# Patient Record
Sex: MALE | Race: BLACK OR AFRICAN AMERICAN | NOT HISPANIC OR LATINO | URBAN - METROPOLITAN AREA
[De-identification: names, ages, dates, MRNs, and addresses within clinical notes are randomized per-mention and may not be internally consistent; named-entity substitution may affect disease eponyms.]

---

## 2017-09-15 ENCOUNTER — EMERGENCY (EMERGENCY)
Facility: HOSPITAL | Age: 27
LOS: 1 days | Discharge: HOME | End: 2017-09-15
Attending: EMERGENCY MEDICINE | Admitting: EMERGENCY MEDICINE
Payer: COMMERCIAL

## 2017-09-15 VITALS
RESPIRATION RATE: 18 BRPM | WEIGHT: 219.36 LBS | OXYGEN SATURATION: 97 % | TEMPERATURE: 98 F | HEART RATE: 59 BPM | SYSTOLIC BLOOD PRESSURE: 138 MMHG | DIASTOLIC BLOOD PRESSURE: 92 MMHG

## 2017-09-15 VITALS
HEART RATE: 64 BPM | SYSTOLIC BLOOD PRESSURE: 134 MMHG | TEMPERATURE: 99 F | OXYGEN SATURATION: 99 % | DIASTOLIC BLOOD PRESSURE: 87 MMHG | RESPIRATION RATE: 18 BRPM

## 2017-09-15 DIAGNOSIS — Z90.89 ACQUIRED ABSENCE OF OTHER ORGANS: Chronic | ICD-10-CM

## 2017-09-15 DIAGNOSIS — J03.90 ACUTE TONSILLITIS, UNSPECIFIED: Chronic | ICD-10-CM

## 2017-09-15 DIAGNOSIS — R05 COUGH: ICD-10-CM

## 2017-09-15 DIAGNOSIS — J45.909 UNSPECIFIED ASTHMA, UNCOMPLICATED: ICD-10-CM

## 2017-09-15 DIAGNOSIS — J06.9 ACUTE UPPER RESPIRATORY INFECTION, UNSPECIFIED: ICD-10-CM

## 2017-09-15 DIAGNOSIS — Z79.899 OTHER LONG TERM (CURRENT) DRUG THERAPY: ICD-10-CM

## 2017-09-15 PROCEDURE — 99283 EMERGENCY DEPT VISIT LOW MDM: CPT | Mod: 25

## 2017-09-15 PROCEDURE — 99284 EMERGENCY DEPT VISIT MOD MDM: CPT | Mod: 25

## 2017-09-15 PROCEDURE — 71020: CPT | Mod: 26

## 2017-09-15 PROCEDURE — 71046 X-RAY EXAM CHEST 2 VIEWS: CPT

## 2017-09-15 RX ORDER — FLUTICASONE PROPIONATE 50 MCG
1 SPRAY, SUSPENSION NASAL
Qty: 1 | Refills: 0 | OUTPATIENT
Start: 2017-09-15 | End: 2017-10-15

## 2017-09-15 RX ORDER — ALBUTEROL 90 UG/1
2 AEROSOL, METERED ORAL
Qty: 1 | Refills: 0 | OUTPATIENT
Start: 2017-09-15 | End: 2017-10-15

## 2017-09-15 NOTE — ED PROVIDER NOTE - LAB INTERPRETATION
ER Physician: June Ree  CHEST XRAY INTERPRETATION: lungs clear, heart shadow normal, bony structures intact

## 2017-09-15 NOTE — ED PROVIDER NOTE - PHYSICAL EXAMINATION
VITAL SIGNS: I have reviewed nursing notes and confirm.  CONSTITUTIONAL: Well-developed; well-nourished; in no acute distress.   SKIN:  warm and dry, no acute rash.   HEAD:  normocephalic, atraumatic.  EYES: PERRL, EOM intact; conjunctiva and sclera clear.  ENT: No nasal discharge; airway clear. O/P: mild erythema to post pharynx, tonsils non-enlarged, no exudates. Uvula midline.   NECK: Supple; non tender.  CARD: S1, S2 normal; no murmurs, gallops, or rubs. Regular rate and rhythm.   RESP:  Clear to auscultation b/l, no wheezes, rales or rhonchi.  ABD: Normal bowel sounds; soft; non-distended; non-tender; no guarding/ rebound.  NEURO: Alert, oriented, grossly unremarkable

## 2017-09-15 NOTE — ED ADULT NURSE NOTE - OBJECTIVE STATEMENT
pt presents to the ED alert and oriented x3 w/ even and unlabored resp w/ hx of asthma c/o productive cough, congestion, body aches, sore throat for two weeks. does not have alb pump at home. in NAD.

## 2017-09-15 NOTE — ED PROVIDER NOTE - OBJECTIVE STATEMENT
Pt is a 28yo m, h/o asthma, no prior intubations, who p/w cough, nasal congestion, rhinorrhea, sorethroat starting 2 wks ago and not improving. Has been using alb neb at night with no improvement. No cp, sob, fever, chills, abd pain, n/v/d, urinary sx's. No sick contacts/ recent travel.

## 2017-09-15 NOTE — ED PROVIDER NOTE - MEDICAL DECISION MAKING DETAILS
Impression: acute uri, likely viral. Afebrile. VSS. CXR neg for i/e. Pt advised on supportive care. Will dc home w/ rx for tessalon perles, flonase, and albuterol inhaler.

## 2017-09-16 NOTE — ED POST DISCHARGE NOTE - RESULT SUMMARY
information relayed to pt regarding final read- to request pmd to request records here for follow up imaging, discussed with radiology attending, non emergent concern, can follow up as routine outpt.

## 2017-09-25 ENCOUNTER — EMERGENCY (EMERGENCY)
Facility: HOSPITAL | Age: 27
LOS: 1 days | Discharge: HOME | End: 2017-09-25
Attending: EMERGENCY MEDICINE | Admitting: EMERGENCY MEDICINE
Payer: COMMERCIAL

## 2017-09-25 VITALS
TEMPERATURE: 99 F | OXYGEN SATURATION: 100 % | DIASTOLIC BLOOD PRESSURE: 96 MMHG | HEART RATE: 58 BPM | RESPIRATION RATE: 16 BRPM | SYSTOLIC BLOOD PRESSURE: 147 MMHG

## 2017-09-25 VITALS
HEART RATE: 56 BPM | DIASTOLIC BLOOD PRESSURE: 91 MMHG | OXYGEN SATURATION: 100 % | TEMPERATURE: 99 F | SYSTOLIC BLOOD PRESSURE: 142 MMHG | RESPIRATION RATE: 17 BRPM | HEIGHT: 72 IN | WEIGHT: 199.96 LBS

## 2017-09-25 DIAGNOSIS — R05 COUGH: ICD-10-CM

## 2017-09-25 DIAGNOSIS — J40 BRONCHITIS, NOT SPECIFIED AS ACUTE OR CHRONIC: ICD-10-CM

## 2017-09-25 DIAGNOSIS — F17.200 NICOTINE DEPENDENCE, UNSPECIFIED, UNCOMPLICATED: ICD-10-CM

## 2017-09-25 DIAGNOSIS — Z79.2 LONG TERM (CURRENT) USE OF ANTIBIOTICS: ICD-10-CM

## 2017-09-25 DIAGNOSIS — Z79.899 OTHER LONG TERM (CURRENT) DRUG THERAPY: ICD-10-CM

## 2017-09-25 DIAGNOSIS — J03.90 ACUTE TONSILLITIS, UNSPECIFIED: Chronic | ICD-10-CM

## 2017-09-25 DIAGNOSIS — Z90.89 ACQUIRED ABSENCE OF OTHER ORGANS: Chronic | ICD-10-CM

## 2017-09-25 PROCEDURE — 94640 AIRWAY INHALATION TREATMENT: CPT

## 2017-09-25 PROCEDURE — 71020: CPT | Mod: 26

## 2017-09-25 PROCEDURE — 99283 EMERGENCY DEPT VISIT LOW MDM: CPT | Mod: 25

## 2017-09-25 PROCEDURE — 99284 EMERGENCY DEPT VISIT MOD MDM: CPT

## 2017-09-25 PROCEDURE — 71046 X-RAY EXAM CHEST 2 VIEWS: CPT

## 2017-09-25 RX ORDER — FLUTICASONE PROPIONATE 220 MCG
2 AEROSOL WITH ADAPTER (GRAM) INHALATION
Qty: 1 | Refills: 0 | OUTPATIENT
Start: 2017-09-25 | End: 2017-10-25

## 2017-09-25 RX ORDER — AZITHROMYCIN 500 MG/1
1 TABLET, FILM COATED ORAL
Qty: 6 | Refills: 0 | OUTPATIENT
Start: 2017-09-25 | End: 2017-09-30

## 2017-09-25 RX ORDER — IPRATROPIUM/ALBUTEROL SULFATE 18-103MCG
3 AEROSOL WITH ADAPTER (GRAM) INHALATION ONCE
Qty: 0 | Refills: 0 | Status: COMPLETED | OUTPATIENT
Start: 2017-09-25 | End: 2017-09-25

## 2017-09-25 RX ADMIN — Medication 3 MILLILITER(S): at 16:18

## 2017-09-25 NOTE — ED PROVIDER NOTE - OBJECTIVE STATEMENT
28 y/o m with h/o asthma presents to ED c/o persistent dry cough x3 weeks, afebrile. He state he recently had a URI but now resolved. Report to feel sob ,wheezing and has to use his nebulizer at home more frequently. Denies URI symptoms at this time, productive cough, chest pain, n, v, abd pain. + h/o smoker.

## 2017-09-25 NOTE — ED ADULT NURSE NOTE - OBJECTIVE STATEMENT
states he was here a few weeks ago, had CXR and dc on tessalon pearls.  states he got a call stating he should come back for another chest xray.  staets cough persists with +runny nose and sore throat. denies fever/chills.  no n/v/d. lungs CTA

## 2017-09-25 NOTE — ED PROVIDER NOTE - ATTENDING CONTRIBUTION TO CARE
patient seen for URI sx and wheezing.  given bronchodilators with resolution of sx, comfortable, lungs clear at time of dc and PF>500.  CXR clear.  will dc.

## 2018-07-30 ENCOUNTER — EMERGENCY (EMERGENCY)
Facility: HOSPITAL | Age: 28
LOS: 1 days | Discharge: HOME | End: 2018-07-30
Admitting: EMERGENCY MEDICINE
Payer: COMMERCIAL

## 2018-07-30 VITALS
HEART RATE: 67 BPM | DIASTOLIC BLOOD PRESSURE: 92 MMHG | RESPIRATION RATE: 17 BRPM | OXYGEN SATURATION: 98 % | SYSTOLIC BLOOD PRESSURE: 143 MMHG

## 2018-07-30 VITALS
TEMPERATURE: 98 F | SYSTOLIC BLOOD PRESSURE: 155 MMHG | HEART RATE: 66 BPM | DIASTOLIC BLOOD PRESSURE: 104 MMHG | RESPIRATION RATE: 18 BRPM | OXYGEN SATURATION: 99 %

## 2018-07-30 DIAGNOSIS — Z79.2 LONG TERM (CURRENT) USE OF ANTIBIOTICS: ICD-10-CM

## 2018-07-30 DIAGNOSIS — M79.671 PAIN IN RIGHT FOOT: ICD-10-CM

## 2018-07-30 DIAGNOSIS — Z90.89 ACQUIRED ABSENCE OF OTHER ORGANS: Chronic | ICD-10-CM

## 2018-07-30 DIAGNOSIS — J03.90 ACUTE TONSILLITIS, UNSPECIFIED: Chronic | ICD-10-CM

## 2018-07-30 DIAGNOSIS — J45.909 UNSPECIFIED ASTHMA, UNCOMPLICATED: ICD-10-CM

## 2018-07-30 DIAGNOSIS — Z79.899 OTHER LONG TERM (CURRENT) DRUG THERAPY: ICD-10-CM

## 2018-07-30 PROCEDURE — 73630 X-RAY EXAM OF FOOT: CPT

## 2018-07-30 PROCEDURE — 73630 X-RAY EXAM OF FOOT: CPT | Mod: 26,RT

## 2018-07-30 PROCEDURE — 99283 EMERGENCY DEPT VISIT LOW MDM: CPT | Mod: 25

## 2018-07-30 PROCEDURE — 99283 EMERGENCY DEPT VISIT LOW MDM: CPT

## 2018-07-30 PROCEDURE — 73630 X-RAY EXAM OF FOOT: CPT | Mod: 26

## 2018-07-30 RX ORDER — IBUPROFEN 200 MG
1 TABLET ORAL
Qty: 20 | Refills: 0 | OUTPATIENT
Start: 2018-07-30 | End: 2018-08-03

## 2018-07-30 RX ORDER — IBUPROFEN 200 MG
800 TABLET ORAL ONCE
Qty: 0 | Refills: 0 | Status: COMPLETED | OUTPATIENT
Start: 2018-07-30 | End: 2018-07-30

## 2018-07-30 RX ADMIN — Medication 800 MILLIGRAM(S): at 07:26

## 2018-07-30 NOTE — ED ADULT TRIAGE NOTE - CHIEF COMPLAINT QUOTE
I was playing basketball yesterday and I think I did something to my foot.  It isn't the ankle, its on the right heel.
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
## 2018-07-30 NOTE — ED PROVIDER NOTE - MEDICAL DECISION MAKING DETAILS
pt c/o r foot pain s/p playing basketball yest, no bony tend, FROM and normal gait, xrays neg, ace wrap and crutches for NWB and to RICE, prn ibuprofen, f/u w/ortho, pt understands and agrees w/plan

## 2018-07-30 NOTE — ED PROVIDER NOTE - MUSCULOSKELETAL, MLM
foot: no swelling, no discolorations, no tend over metatarsals, no tend over medial or lateral malleolus, achilles tendon intact, pedal pulse 2+, discomfort over plantar fascia, FROM w/5/5 muscle strength, good resistance

## 2018-07-30 NOTE — ED ADULT NURSE NOTE - CHPI ED SYMPTOMS NEG
no chills/no decreased eating/drinking/no vomiting/no nausea/no numbness/no tingling/no weakness/no fever

## 2018-12-12 ENCOUNTER — EMERGENCY (EMERGENCY)
Facility: HOSPITAL | Age: 28
LOS: 1 days | Discharge: HOME | End: 2018-12-12
Attending: EMERGENCY MEDICINE | Admitting: EMERGENCY MEDICINE
Payer: COMMERCIAL

## 2018-12-12 VITALS
DIASTOLIC BLOOD PRESSURE: 83 MMHG | TEMPERATURE: 98 F | HEART RATE: 91 BPM | RESPIRATION RATE: 18 BRPM | WEIGHT: 229.94 LBS | SYSTOLIC BLOOD PRESSURE: 117 MMHG | OXYGEN SATURATION: 100 %

## 2018-12-12 DIAGNOSIS — J03.90 ACUTE TONSILLITIS, UNSPECIFIED: Chronic | ICD-10-CM

## 2018-12-12 DIAGNOSIS — Z79.2 LONG TERM (CURRENT) USE OF ANTIBIOTICS: ICD-10-CM

## 2018-12-12 DIAGNOSIS — R05 COUGH: ICD-10-CM

## 2018-12-12 DIAGNOSIS — J45.20 MILD INTERMITTENT ASTHMA, UNCOMPLICATED: ICD-10-CM

## 2018-12-12 DIAGNOSIS — Z79.1 LONG TERM (CURRENT) USE OF NON-STEROIDAL ANTI-INFLAMMATORIES (NSAID): ICD-10-CM

## 2018-12-12 DIAGNOSIS — Z90.89 ACQUIRED ABSENCE OF OTHER ORGANS: Chronic | ICD-10-CM

## 2018-12-12 DIAGNOSIS — Z79.899 OTHER LONG TERM (CURRENT) DRUG THERAPY: ICD-10-CM

## 2018-12-12 PROCEDURE — 99283 EMERGENCY DEPT VISIT LOW MDM: CPT | Mod: 25

## 2018-12-12 PROCEDURE — 99283 EMERGENCY DEPT VISIT LOW MDM: CPT

## 2018-12-12 PROCEDURE — 94640 AIRWAY INHALATION TREATMENT: CPT

## 2018-12-12 RX ORDER — ALBUTEROL 90 UG/1
1 AEROSOL, METERED ORAL ONCE
Qty: 0 | Refills: 0 | Status: COMPLETED | OUTPATIENT
Start: 2018-12-12 | End: 2018-12-12

## 2018-12-12 RX ADMIN — ALBUTEROL 1 PUFF(S): 90 AEROSOL, METERED ORAL at 09:17

## 2018-12-12 NOTE — ED PROVIDER NOTE - NSFOLLOWUPINSTRUCTIONS_ED_ALL_ED_FT
Return to the ER for chest pain, shortness of breath that does not improve with Albuterol or any concerns. Otherwise, use your albuterol as needed for asthma symptoms. Follow up with your regular doctor in 1-2 weeks.

## 2018-12-12 NOTE — ED ADULT NURSE NOTE - NSIMPLEMENTINTERV_GEN_ALL_ED
Implemented All Universal Safety Interventions:  [place] to call system. Call bell, personal items and telephone within reach. Instruct patient to call for assistance. Room bathroom lighting operational. Non-slip footwear when patient is off stretcher. Physically safe environment: no spills, clutter or unnecessary equipment. Stretcher in lowest position, wheels locked, appropriate side rails in place.

## 2018-12-12 NOTE — ED ADULT NURSE NOTE - OBJECTIVE STATEMENT
Pt c/o asthma exacerbation since Saturday. Pt does not have inhaler at home so he hasn't been able to treat himself. Pt states he needs a note to go back to work tomorrow. Pt speaking clearly, pt singing in no distress. Denies chest pain.

## 2018-12-12 NOTE — ED ADULT TRIAGE NOTE - OTHER COMPLAINTS
"I missed a couple of days of work for my asthma and someone told me to come to a hospital to get a work note" patient states he also needs an inhaler. lung sounds clear, speaking in full sentences, sating 100% RA.

## 2018-12-12 NOTE — ED PROVIDER NOTE - OBJECTIVE STATEMENT
28M with hx of asthma started 2 days ago, missed work yesterday, sent in from work today for note for clearance to come back to work. No fever, no productive cough, min cough, pt denies cp, sob, no cough currently no sore throat, no fever, no headache, no other complaints, states he ran out of his asthma medication.

## 2018-12-12 NOTE — ED PROVIDER NOTE - PHYSICAL EXAMINATION
gen: no acute distress, comfortable, conversant  HEENT: oropharynx clear no exudate  Neck: supple, no meningismus, no bruit noted bl carotid  CV: rrr no m/r/g 2+ radial pulse  Resp: ctab, no w/c/r  Abd: nontender, no rebound/guarding  Ext: no edema, pedal pulses 2+  Neuro: alert and oriented, cn grossly intact, strength equal in all 4 ext, sensation intact to light touch f/a/l, nl coordination, gait steady

## 2018-12-12 NOTE — ED PROVIDER NOTE - MEDICAL DECISION MAKING DETAILS
28M with concern for asthma, cough. Vital signs wnl, pt is asymptomatic at this time sent in by work for note clearance, pt states he had cough/cold symptoms last 2 days now resolved, peak flow is 500,  lungs clear vs normal, given albuterol inhaler will dc home with work note.

## 2019-03-01 ENCOUNTER — EMERGENCY (EMERGENCY)
Facility: HOSPITAL | Age: 29
LOS: 1 days | Discharge: HOME | End: 2019-03-01
Admitting: EMERGENCY MEDICINE
Payer: COMMERCIAL

## 2019-03-01 VITALS
SYSTOLIC BLOOD PRESSURE: 135 MMHG | TEMPERATURE: 98 F | WEIGHT: 270.07 LBS | RESPIRATION RATE: 16 BRPM | DIASTOLIC BLOOD PRESSURE: 88 MMHG | HEART RATE: 67 BPM | OXYGEN SATURATION: 99 %

## 2019-03-01 DIAGNOSIS — Z90.89 ACQUIRED ABSENCE OF OTHER ORGANS: Chronic | ICD-10-CM

## 2019-03-01 DIAGNOSIS — J45.909 UNSPECIFIED ASTHMA, UNCOMPLICATED: ICD-10-CM

## 2019-03-01 DIAGNOSIS — M54.9 DORSALGIA, UNSPECIFIED: ICD-10-CM

## 2019-03-01 DIAGNOSIS — M54.5 LOW BACK PAIN: ICD-10-CM

## 2019-03-01 DIAGNOSIS — J03.90 ACUTE TONSILLITIS, UNSPECIFIED: Chronic | ICD-10-CM

## 2019-03-01 PROCEDURE — 99282 EMERGENCY DEPT VISIT SF MDM: CPT | Mod: 25

## 2019-03-01 PROCEDURE — 99282 EMERGENCY DEPT VISIT SF MDM: CPT

## 2019-03-01 NOTE — ED PROVIDER NOTE - CLINICAL SUMMARY MEDICAL DECISION MAKING FREE TEXT BOX
This is a pleasant 29 year old male presenting to the ed with low back pain x1 week. states that it is getting better. Patient currently does not not have any symptoms concerning for cauda equina syndrome such as saddle anesthesia, bowel or bladder incontinence or abscess. Patient does not endorse trauma to the area and therefore do not believe that imaging is warrented at this time. No history of iv drug use or history of cancer. No note of fever on vitals. Believe that pain is most likeley muscular in nature. High sensitivity neurologic exam does not exhibit deficit on physical examination. Patient was advised to avoid driving, operating heavy machinery, or drinking alcohol when taking pain medication and/or muscle relaxers and to avoid heavy lifting and pain exacerbatieng movement. Patient is advised to use heat as well as ice and massage and stay active. Patient was advised to avoid pain exacerbation activities as well as avoid prolonged sitting, standing and bed rest. ED evaluation and management discussed with the patient and family (if available) in detail.  Close PMD follow up encouraged.  Strict ED return instructions discussed in detail and patient given the opportunity to ask any questions about their discharge diagnosis and instructions. Patient verbalized understanding. Patient is agreeable to plan.

## 2019-03-01 NOTE — ED ADULT NURSE NOTE - NSIMPLEMENTINTERV_GEN_ALL_ED
Implemented All Universal Safety Interventions:  [place] to call system. Call bell, personal items and telephone within reach. Instruct patient to call for assistance. Room bathroom lighting operational. Non-slip footwear when patient is off stretcher. Physically safe environment: no spills, clutter or unnecessary equipment. Stretcher in lowest position, wheels locked, appropriate side rails in place.

## 2019-03-01 NOTE — ED ADULT TRIAGE NOTE - CHIEF COMPLAINT QUOTE
pt. c/o lower back pain s/p fall last week, pt. denies any radiation of pain, numbness, tingling, incontinence.

## 2019-03-01 NOTE — ED PROVIDER NOTE - NSFOLLOWUPINSTRUCTIONS_ED_ALL_ED_FT
please use heat on your back and take tylenol or motrin for pain as needed    Back pain is very common in adults. The cause of back pain is rarely dangerous and the pain often gets better over time. The cause of your back pain may not be known and may include strain of muscles or ligaments, degeneration of the spinal disks, or arthritis. Occasionally the pain may radiate down your leg(s). Over-the-counter medicines to reduce pain and inflammation are often the most helpful. Stretching and remaining active frequently helps the healing process.     SEEK IMMEDIATE MEDICAL CARE IF YOU HAVE ANY OF THE FOLLOWING SYMPTOMS: bowel or bladder control problems, unusual weakness or numbness in your arms or legs, nausea or vomiting, abdominal pain, fever, dizziness/lightheadedness.

## 2019-03-01 NOTE — ED PROVIDER NOTE - PHYSICAL EXAMINATION
General Appearance: Patient is well developed and well nourished. Patient is lying in stretcher, not diaphoretic and does not appear in acute distress.      Pulm: Breath sounds present throughout all lung fields. Chest expansion symmetric bilaterally. No evidence of wheezes, rales, rhonchi or retraction.       Cardio: Regular rate and rhythm. No murmurs, rubs or gallops.    Abdomen: Bowel sounds present all 4 quadrants Soft nontender. no note of pulsatile masses. No rebound or guarding.      MSK: No evidence of edema, erythema or bony deformity on body. Strength 5/5 in arms and legs bilaterally. No evidence of paraspinal muscle spasm/tenderness. There is no spinal midline tenderness, step offs or crepitus. ROM of the      Neuro: Distal sensation intact in arms and legs bilaterally. Sensory Knee and ankle reflexes 2+ and symmetric bilaterally. gait steady. gcs 15.    psych: mood and affect appropriate.     skin: warm dry and intact- no note of erythema edema purpura petechia ecchymosis

## 2019-03-01 NOTE — ED ADULT NURSE NOTE - MUSCULOSKELETAL WDL
Full range of motion of upper and lower extremities, no joint tenderness/swelling. Good strength in upper/lower extremities bilateral against resistance, normal sensation in upper/lower extremities bilaterally when touched with finger.

## 2019-03-01 NOTE — ED PROVIDER NOTE - OBJECTIVE STATEMENT
states that he sustained a fall last week. states that he is having pain in his back "but it's getting better". no head injury. states that he has not been taking any medications, no bowel or bladder incontinence, saddle anesthesia. states "I missed work yesterday and today so I need a work note". no history of fevers or chills, immunodeficiency or IVDU.

## 2019-03-01 NOTE — ED ADULT NURSE NOTE - CHPI ED NUR SYMPTOMS NEG
no anorexia/no constipation/no bladder dysfunction/no difficulty bearing weight/no fatigue/no motor function loss/no neck tenderness/no bowel dysfunction/no numbness/no tingling
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
## 2019-11-15 ENCOUNTER — EMERGENCY (EMERGENCY)
Facility: HOSPITAL | Age: 29
LOS: 1 days | Discharge: HOME | End: 2019-11-15
Attending: EMERGENCY MEDICINE | Admitting: EMERGENCY MEDICINE
Payer: COMMERCIAL

## 2019-11-15 VITALS
DIASTOLIC BLOOD PRESSURE: 78 MMHG | SYSTOLIC BLOOD PRESSURE: 125 MMHG | WEIGHT: 240.08 LBS | HEART RATE: 60 BPM | TEMPERATURE: 98 F | HEIGHT: 71 IN | RESPIRATION RATE: 18 BRPM | OXYGEN SATURATION: 100 %

## 2019-11-15 DIAGNOSIS — J03.90 ACUTE TONSILLITIS, UNSPECIFIED: Chronic | ICD-10-CM

## 2019-11-15 DIAGNOSIS — Z90.89 ACQUIRED ABSENCE OF OTHER ORGANS: Chronic | ICD-10-CM

## 2019-11-15 PROCEDURE — 99284 EMERGENCY DEPT VISIT MOD MDM: CPT

## 2019-11-15 PROCEDURE — 99283 EMERGENCY DEPT VISIT LOW MDM: CPT

## 2019-11-15 RX ORDER — ALBUTEROL 90 UG/1
2 AEROSOL, METERED ORAL
Qty: 8 | Refills: 0
Start: 2019-11-15

## 2019-11-15 NOTE — ED PROVIDER NOTE - OBJECTIVE STATEMENT
30 y/o m hx asthma (never intubated or hospitalized) presents c/o intermittent chest tightness for the past week.  Pt stating doesn't currently have an albuterol inhaler, reports cold weather giving him chest tightness when outside, hasn't been able to see pmd for this.  Denies fever, chills, CP, SOB, cough, all other ROS negative.

## 2019-11-15 NOTE — ED ADULT TRIAGE NOTE - OTHER COMPLAINTS
has not had his inhaler for 1 year. reports his chest feels tighter in the cold weather. lung sounds CTA

## 2019-11-15 NOTE — ED ADULT NURSE NOTE - OBJECTIVE STATEMENT
has not had his inhaler for 1 year. reports his chest feels tighter in the cold weather. lung sounds CTA  nad, ambulated to bed without difficulty

## 2019-11-15 NOTE — ED PROVIDER NOTE - ATTENDING CONTRIBUTION TO CARE
Pt is a 30yo m, h/o asthma, no prior intubations, who p/w c/o feeling sob intermittent over the past week whenever walking outside in cold weather. Pt does not have albuterol inhaler at home and is here seek rx for one. No f/c, uri sx's, chest pain, leg pain, swelling, prolonged immobility...    VITAL SIGNS: I have reviewed nursing notes and confirm.  CONSTITUTIONAL: Well-developed; well-nourished; in no acute distress.   SKIN:  warm and dry, no acute rash.   HEAD:  normocephalic, atraumatic.  EYES: EOM intact; conjunctiva and sclera clear.  ENT: No nasal discharge; airway clear.   NECK: Supple; non tender.  CARD: S1, S2 normal; no murmurs, gallops, or rubs. Regular rate and rhythm.   RESP:  Clear to auscultation b/l, no wheezes, rales or rhonchi.  EXT: Normal ROM. No clubbing, cyanosis or edema. 2+ pulses to b/l ue/le.  NEURO: Alert, oriented, grossly unremarkable  PSYCH: Cooperative, mood and affect appropriate.    Pt offered neb tx in ed, however declined as pt is asymptomatic at present. Will dc home w/ rx for albuterol and pt to f/u with pcp for re-evaluation. Pt given return precautions and is understanding of discharge plan of care.

## 2019-11-15 NOTE — ED PROVIDER NOTE - PATIENT PORTAL LINK FT
You can access the FollowMyHealth Patient Portal offered by [facility] by registering at the following website: [email or web address]. By joining [employer]’s FollowMyHealth portal, you will also be able to view your health information using other applications (apps) compatible with our system.

## 2019-11-15 NOTE — ED PROVIDER NOTE - NSFOLLOWUPINSTRUCTIONS_ED_ALL_ED_FT
Asthma    WHAT YOU NEED TO KNOW:    Asthma is a lung disease that makes breathing difficult. Chronic inflammation and reactions to triggers narrow the airways in the lungs. Asthma can become life-threatening if it is not managed.     DISCHARGE INSTRUCTIONS:    Call your local emergency number (911 in the [place]) if:     You have severe shortness of breath.       Your lips or nails turn blue or gray.       The skin around your neck and ribs pulls in with each breath.      You have shortness of breath, even after you take your short-term medicine as directed.       Your peak flow numbers are in the red zone of your AAP.     Call your doctor if:     You run out of medicine before your next refill is due.      Your symptoms get worse.       You need to take more medicine than usual to control your symptoms.       You have questions or concerns about your condition or care.    Medicines:     Medicines decrease inflammation, open airways, and make it easier to breathe. Medicines may be inhaled, taken as a pill, or injected. Short-term medicines relieve your symptoms quickly. Long-term medicines are used to prevent future attacks. You may also need medicine to help control your allergies. Ask your healthcare provider for more information about the medicine you are given and how to take it safely.      Take your medicine as directed. Contact your healthcare provider if you think your medicine is not helping or if you have side effects. Tell him of her if you are allergic to any medicine. Keep a list of the medicines, vitamins, and herbs you take. Include the amounts, and when and why you take them. Bring the list or the pill bottles to follow-up visits. Carry your medicine list with you in case of an emergency.    Follow up with your healthcare provider as directed: You will need to return to make sure your medicine is working and your symptoms are controlled. You may be referred to an asthma specialist. You may be asked to keep a record of your peak flow values and bring it with you to your appointments. Write down your questions so you remember to ask them during your visits.    Manage your symptoms and prevent future attacks:     Follow your Asthma Action Plan (AAP). This is a written plan that you and your healthcare provider create. It explains which medicine you need and when to change doses if necessary. It also explains how you can monitor symptoms and use a peak flow meter. The meter measures how well your lungs are working.       Manage other health conditions, such as allergies, acid reflux, and sleep apnea.       Identify and avoid triggers. These may include pets, dust mites, mold, and cockroaches.      Do not smoke or be around others who smoke. Nicotine and other chemicals in cigarettes and cigars can cause lung damage. Ask your healthcare provider for information if you currently smoke and need help to quit. E-cigarettes or smokeless tobacco still contain nicotine. Talk to your healthcare provider before you use these products.       Ask about the flu vaccine. The flu can make your asthma worse. You may need a yearly flu shot.

## 2019-11-15 NOTE — ED ADULT NURSE NOTE - CHPI ED NUR SYMPTOMS NEG
no fever/no chest pain/no chills/no wheezing/no cough/no headache/no hemoptysis/no body aches/no diaphoresis/no edema

## 2019-11-15 NOTE — ED ADULT NURSE NOTE - NSIMPLEMENTINTERV_GEN_ALL_ED
Implemented All Universal Safety Interventions:  [place] to call system. Call bell, personal items and telephone within reach. Instruct patient to call for assistance. Room bathroom lighting operational. Non-slip footwear when patient is off stretcher. Physically safe environment: no spills, clutter or unnecessary equipment. Stretcher in lowest position, wheels locked, appropriate side rails in place.

## 2019-11-15 NOTE — ED PROVIDER NOTE - CLINICAL SUMMARY MEDICAL DECISION MAKING FREE TEXT BOX
28 y/o m hx asthma presents reporting chest tightness for the past week, asking for albuterol inhaler; asymptomatic in ED, lungs clear, offered neb which he declined, asking for rx albuterol inhaler which was given, advised to f/u with pmd.

## 2019-11-19 DIAGNOSIS — R07.89 OTHER CHEST PAIN: ICD-10-CM

## 2019-11-19 DIAGNOSIS — J45.909 UNSPECIFIED ASTHMA, UNCOMPLICATED: ICD-10-CM

## 2020-03-30 ENCOUNTER — EMERGENCY (EMERGENCY)
Facility: HOSPITAL | Age: 30
LOS: 1 days | Discharge: HOME | End: 2020-03-30
Attending: EMERGENCY MEDICINE | Admitting: EMERGENCY MEDICINE
Payer: SELF-PAY

## 2020-03-30 VITALS
WEIGHT: 240.08 LBS | DIASTOLIC BLOOD PRESSURE: 114 MMHG | SYSTOLIC BLOOD PRESSURE: 159 MMHG | RESPIRATION RATE: 16 BRPM | HEIGHT: 71 IN | HEART RATE: 67 BPM | OXYGEN SATURATION: 99 % | TEMPERATURE: 98 F

## 2020-03-30 VITALS
OXYGEN SATURATION: 99 % | DIASTOLIC BLOOD PRESSURE: 99 MMHG | SYSTOLIC BLOOD PRESSURE: 147 MMHG | HEART RATE: 75 BPM | TEMPERATURE: 98 F | RESPIRATION RATE: 18 BRPM

## 2020-03-30 DIAGNOSIS — J03.90 ACUTE TONSILLITIS, UNSPECIFIED: Chronic | ICD-10-CM

## 2020-03-30 DIAGNOSIS — M54.5 LOW BACK PAIN: ICD-10-CM

## 2020-03-30 DIAGNOSIS — R03.0 ELEVATED BLOOD-PRESSURE READING, WITHOUT DIAGNOSIS OF HYPERTENSION: ICD-10-CM

## 2020-03-30 DIAGNOSIS — Z90.89 ACQUIRED ABSENCE OF OTHER ORGANS: Chronic | ICD-10-CM
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
## 2020-03-30 PROCEDURE — 99284 EMERGENCY DEPT VISIT MOD MDM: CPT

## 2020-03-30 PROCEDURE — 99282 EMERGENCY DEPT VISIT SF MDM: CPT
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
## 2020-03-30 NOTE — ED ADULT NURSE NOTE - CAS ELECT INFOMATION PROVIDED
Patient son called stating that Marlene has severe wheezing and slight SOB, her pulse oximetry is 96%, They are requesting for you to prescribe a nebulizer medication. she is also running a low grade fever. Son says he has a nebulizer machine at home but does not have albuterol. He is concerned about her wheezing, but wants to avoid taking her to ER if he doesn't have to. Please advise.    patient left w/o papers
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
## 2020-03-30 NOTE — ED PROVIDER NOTE - PATIENT PORTAL LINK FT
You can access the FollowMyHealth Patient Portal offered by [facility] by registering at the following website: [email or web address]. By joining [employer]’s FollowMyHealth portal, you will also be able to view your health information using other applications (apps) compatible with our system.

## 2020-03-30 NOTE — ED ADULT TRIAGE NOTE - OTHER COMPLAINTS
patient c/o back pain x saturday---ambulated independently with steady gait----denies CP/SOB/chills/fever/cough

## 2020-03-30 NOTE — ED PROVIDER NOTE - NSFOLLOWUPCLINICS_GEN_ALL_ED_FT
[facility]  Family Medicine  [facility]. [address]  [place], [place] [facility]  Phone: [phone]  Fax:   Follow Up Time:

## 2020-03-30 NOTE — ED PROVIDER NOTE - NSFOLLOWUPINSTRUCTIONS_ED_ALL_ED_FT
Low Back Strain    WHAT YOU NEED TO KNOW:    Low back strain is an injury to your lower back muscles or tendons. Tendons are strong tissues that connect muscles to bones. The lower back supports most of your body weight and helps you move, twist, and bend.    DISCHARGE INSTRUCTIONS:    Return to the emergency department if:     You hear or feel a pop in your lower back.      You have increased swelling or pain in your lower back.      You have trouble moving your legs.      Your legs are numb.    Contact your healthcare provider if:     You have a fever.      Your pain does not go away, even after treatment.       You have questions or concerns about your condition or care.     Medicines: The following medicines may be ordered by your healthcare provider:     Acetaminophen decreases pain and fever. It is available without a doctor's order. Ask how much to take and how often to take it. Follow directions. Acetaminophen can cause liver damage if not taken correctly.      NSAIDs, such as ibuprofen, help decrease swelling, pain, and fever. This medicine is available with or without a doctor's order. NSAIDs can cause stomach bleeding or kidney problems in certain people. If you take blood thinner medicine, always ask your healthcare provider if NSAIDs are safe for you. Always read the medicine label and follow directions.      Muscle relaxers help decrease pain and muscle spasms.      Prescription pain medicine may be given. Ask how to take this medicine safely.      Take your medicine as directed. Contact your healthcare provider if you think your medicine is not helping or if you have side effects. Tell him or her if you are allergic to any medicine. Keep a list of the medicines, vitamins, and herbs you take. Include the amounts, and when and why you take them. Bring the list or the pill bottles to follow-up visits. Carry your medicine list with you in case of an emergency.    Self-care:     Rest as directed. You may need to rest in bed for a period of time after your injury. Do not lift heavy objects.       Apply ice on your back for 15 to 20 minutes every hour or as directed. Use an ice pack, or put crushed ice in a plastic bag. Cover it with a towel. Ice helps prevent tissue damage and decreases swelling and pain.      Apply heat on your lower back for 20 to 30 minutes every 2 hours for as many days as directed. Heat helps decrease pain and muscle spasms.      Slowly start to increase your activity as the pain decreases, or as directed.    Prevent another low back strain:     Use correct body movements.   Bend at the hips and knees when you  objects. Do not bend from the waist. Use your leg muscles as you lift the load. Do not use your back. Keep the object close to your chest as you lift it. Try not to twist or lift anything above your waist.      Change your position often when you stand for long periods of time. Rest one foot on a small box or footrest, and then switch to the other foot often.      Try not to sit for long periods of time. When you do, sit in a straight-backed chair with your feet flat on the floor.      Never reach, pull, or push while you are sitting.      Warm up before you exercise. Do exercises that strengthen your back muscles. Ask your healthcare provider about the best exercise plan for you.      Maintain a healthy weight. Ask your healthcare provider how much you should weigh. Ask him to help you create a weight loss plan if you are overweight.

## 2020-03-30 NOTE — ED ADULT NURSE NOTE - ED CARDIAC HEART SOUNDS
6year-old female with recurrent trochanteric hip bursitis with a tight ITB band  This is likely secondary to a significant growth spurt she has been going through  We went over stretching exercises  I have given her script for physical therapy  She will take ibuprofen as needed  Follow up if no improvement in 6-8 weeks  normal S1, S2 heard
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
## 2020-03-30 NOTE — ED PROVIDER NOTE - PHYSICAL EXAMINATION
NAD, sitting comfortably in chair, antalgic gait  Back: no midline tenderness, no paraspinal tenderness, no spasms, no CVA tenderness, negative SLR  LUE - sensation intact to light touch, 5/5 /biceps/triceps strength, 2+ radial pulse, no open wounds/erythema/ecchymoses, no tenderness through upper extremity, FROM without pain  LLE - sensation intact to light touch, 5/5 EHL/FHL/TA/GS, 2+ DP pulse, no open wounds/erythema/ecchymoses, no tenderness throughout lower extremity, FROM without pain  RUE - sensation intact to light touch, 5/5 /biceps/triceps strength, 2+ radial pulse, no open wounds/erythema/ecchymoses, no tenderness through upper extremity, FROM without pain  RLE - sensation intact to light touch, 5/5 EHL/FHL/TA/GS, 2+ DP pulse, no open wounds/erythema/ecchymoses, no tenderness throughout lower extremity, FROM without pain

## 2020-07-16 NOTE — ED ADULT TRIAGE NOTE - PAIN: PRESENCE, MLM
Notified pt he v/u  ----- Message from JOHNNY Owens sent at 7/16/2020  2:42 PM CDT -----  Please let Cayetano alexis that his lab is Stable, still undetectable [id number]     complains of pain/discomfort

## 2022-06-20 ENCOUNTER — EMERGENCY (EMERGENCY)
Facility: HOSPITAL | Age: 32
LOS: 1 days | Discharge: HOME | End: 2022-06-20
Admitting: EMERGENCY MEDICINE
Payer: COMMERCIAL

## 2022-06-20 VITALS
DIASTOLIC BLOOD PRESSURE: 94 MMHG | HEIGHT: 71 IN | TEMPERATURE: 98 F | HEART RATE: 68 BPM | OXYGEN SATURATION: 99 % | SYSTOLIC BLOOD PRESSURE: 145 MMHG | WEIGHT: 274.92 LBS | RESPIRATION RATE: 18 BRPM

## 2022-06-20 VITALS
DIASTOLIC BLOOD PRESSURE: 89 MMHG | OXYGEN SATURATION: 100 % | TEMPERATURE: 98 F | HEART RATE: 78 BPM | RESPIRATION RATE: 19 BRPM | SYSTOLIC BLOOD PRESSURE: 135 MMHG

## 2022-06-20 DIAGNOSIS — M79.672 PAIN IN LEFT FOOT: ICD-10-CM

## 2022-06-20 DIAGNOSIS — Z90.89 ACQUIRED ABSENCE OF OTHER ORGANS: Chronic | ICD-10-CM

## 2022-06-20 DIAGNOSIS — J03.90 ACUTE TONSILLITIS, UNSPECIFIED: Chronic | ICD-10-CM

## 2022-06-20 DIAGNOSIS — J45.909 UNSPECIFIED ASTHMA, UNCOMPLICATED: ICD-10-CM

## 2022-06-20 PROCEDURE — 73620 X-RAY EXAM OF FOOT: CPT | Mod: 26

## 2022-06-20 PROCEDURE — 96372 THER/PROPH/DIAG INJ SC/IM: CPT

## 2022-06-20 PROCEDURE — 99284 EMERGENCY DEPT VISIT MOD MDM: CPT | Mod: 25

## 2022-06-20 PROCEDURE — 99283 EMERGENCY DEPT VISIT LOW MDM: CPT | Mod: 25

## 2022-06-20 PROCEDURE — 73630 X-RAY EXAM OF FOOT: CPT | Mod: 26,LT

## 2022-06-20 PROCEDURE — 73630 X-RAY EXAM OF FOOT: CPT

## 2022-06-20 RX ORDER — KETOROLAC TROMETHAMINE 30 MG/ML
15 SYRINGE (ML) INJECTION ONCE
Refills: 0 | Status: DISCONTINUED | OUTPATIENT
Start: 2022-06-20 | End: 2022-06-20

## 2022-06-20 RX ORDER — ACETAMINOPHEN 500 MG
975 TABLET ORAL ONCE
Refills: 0 | Status: COMPLETED | OUTPATIENT
Start: 2022-06-20 | End: 2022-06-20

## 2022-06-20 RX ADMIN — Medication 15 MILLIGRAM(S): at 01:46

## 2022-06-20 RX ADMIN — Medication 975 MILLIGRAM(S): at 01:45

## 2022-06-20 NOTE — ED PROVIDER NOTE - CLINICAL SUMMARY MEDICAL DECISION MAKING FREE TEXT BOX
pt w atraumatic heel pain x 2 months. similar to pain w plantar fascitis in past   no bony tenderness on exam. neurovascularly intact. able to ambulate with steady gait.  likely sprain / strain or plantar fasciitis.   do not suspect acute bony injury but will get xr to r/o.   analgesia w tylenol, nsaids.  if negative xr , plan for dc w podiatry follow up.

## 2022-06-20 NOTE — ED ADULT NURSE NOTE - OBJECTIVE STATEMENT
32y Male A&OX4 C/O Right heal pain for 2 months. Denies fall nor trauma. Pt ambulates with a limp unassisted. Denies fever, chills, cough, n/v/d, nor chest pain.

## 2022-06-20 NOTE — ED PROVIDER NOTE - PHYSICAL EXAMINATION
left lower extremity - skin intact, no swelling/ecchymosis/erythema, no tenderness over medial malleolus/lateral malleolus/calcaneous/metatarsals/proximal fibula/knee, FROM toes/ankle/knee without difficulty or pain, dorsiflexion and plantar flexion normal, sensation intact throughout, pulses 2+, cap refill <2sec

## 2022-06-20 NOTE — ED PROVIDER NOTE - NSFOLLOWUPCLINICS_GEN_ALL_ED_FT
[facility]iatry [facility]  Podiatry  [address] [place] [address]  Phone: [phone]  Fax:

## 2022-06-20 NOTE — ED ADULT NURSE NOTE - NS_ED_NURSE_TEACHING_TOPIC_ED_A_ED
Patient Seen in: [facility] Emergency Department    History   Patient presents with:  Dyspnea GERONIMO SOB (respiratory)    Stated Complaint: sob ekg changes    HPI    58-year-old female presents here to the ER complaining of unsteady gait and shortness changes  Other systems are as noted in HPI. Constitutional and vital signs reviewed. All other systems reviewed and negative except as noted above. PSFH elements reviewed from today and agreed except as otherwise stated in HPI.     Physical Exam All other components within normal limits   PRO BETA NATRIURETIC PEPTIDE - Abnormal; Notable for the following:     Pro-Beta Natriuretic Peptide 490 (*)     All other components within normal limits   CBC W/ DIFFERENTIAL - Abnormal; Notable for the followi clinically for any evidence of intracranial hypertension.  There is opacification of the partially visualized left maxillary sinus either due to chronic sinusitis, a chronic mucous retention cyst or a mucocele.        Impression     CONCLUSION:  No acute in admission. I informed the patient. She was admitted for further care.       Disposition and Plan     Clinical Impression:  Abnormality of gait  (primary encounter diagnosis)  Vertigo  Dyspnea on exertion    Disposition:  Admit    Follow-up:  No follow-up Ortho Referral

## 2022-06-20 NOTE — ED PROVIDER NOTE - NSFOLLOWUPINSTRUCTIONS_ED_ALL_ED_FT
Your xrays were reassuring to rule out an acute medical emergency.   There is always a chance there is a small fracture missed on xray - if your pain persists you need to follow up with orthopedics / podiatry for further imaging.   Take over the counter medications 9tylenol / motrin) for pain you can alternate the medications if one alone is not working.   Get plenty of rest, elevate the leg to help with pain / swelling.   Use an ACE Wrap to help with swelling. Ice the affected area.   Follow up with your podiatrist for further evaluation. If you cannot see yours, call number of office listed here to make an appointment.       ___________________        Plantar Fasciitis       Plantar fasciitis is a painful foot condition that affects the heel. It occurs when the band of tissue that connects the toes to the heel bone (plantar fascia) becomes irritated. This can happen as the result of exercising too much or doing other repetitive activities (overuse injury).    Plantar fasciitis can cause mild irritation to severe pain that makes it difficult to walk or move. The pain is usually worse in the morning after sleeping, or after sitting or lying down for a period of time. Pain may also be worse after long periods of walking or standing.      What are the causes?    This condition may be caused by:  •Standing for long periods of time.      •Wearing shoes that do not have good arch support.      •Doing activities that put stress on joints (high-impact activities). This includes ballet and exercise that makes your heart beat faster (aerobic exercise), such as running.      •Being overweight.      •An abnormal way of walking (gait).      •Tight muscles in the back of your lower leg (calf).      •High arches in your feet or flat feet.      •Starting a new athletic activity.        What are the signs or symptoms?    The main symptom of this condition is heel pain. Pain may get worse after the following:  •Taking the first steps after a time of rest, especially in the morning after awakening, or after you have been sitting or lying down for a while.      •Long periods of standing still.      Pain may decrease after 30–45 minutes of activity, such as gentle walking.      How is this diagnosed?    This condition may be diagnosed based on your medical history, a physical exam, and your symptoms. Your health care provider will check for:  •A tender area on the bottom of your foot.      •A high arch in your foot or flat feet.      •Pain when you move your foot.      •Difficulty moving your foot.      You may have imaging tests to confirm the diagnosis, such as:  •X-rays.      •Ultrasound.      •MRI.        How is this treated?    Treatment for plantar fasciitis depends on how severe your condition is. Treatment may include:  •Rest, ice, pressure (compression), and raising (elevating) the affected foot. This is called RICE therapy. Your health care provider may recommend RICE therapy along with over-the-counter pain medicines to manage your pain.      •Exercises to stretch your calves and your plantar fascia.      •A splint that holds your foot in a stretched, upward position while you sleep (night splint).      •Physical therapy to relieve symptoms and prevent problems in the future.      •Injections of steroid medicine (cortisone) to relieve pain and inflammation.      •Stimulating your plantar fascia with electrical impulses (extracorporeal shock wave therapy). This is usually the last treatment option before surgery.      •Surgery, if other treatments have not worked after 12 months.        Follow these instructions at home:      Managing pain, stiffness, and swelling    •If directed, put ice on the painful area. To do this:  •Put ice in a plastic bag, or use a frozen bottle of water.      •Place a towel between your skin and the bag or bottle.      •Roll the bottom of your foot over the bag or bottle.      •Do this for 20 minutes, 2–3 times a day.        •Wear athletic shoes that have air-sole or gel-sole cushions, or try soft shoe inserts that are designed for plantar fasciitis.      •Elevate your foot above the level of your heart while you are sitting or lying down.      Activity     •Avoid activities that cause pain. Ask your health care provider what activities are safe for you.      •Do physical therapy exercises and stretches as told by your health care provider.      •Try activities and forms of exercise that are easier on your joints (low impact). Examples include swimming, water aerobics, and biking.      General instructions     •Take over-the-counter and prescription medicines only as told by your health care provider.      •Wear a night splint while sleeping, if told by your health care provider. Loosen the splint if your toes tingle, become numb, or turn cold and blue.      •Maintain a healthy weight, or work with your health care provider to lose weight as needed.      •Keep all follow-up visits. This is important.        Contact a health care provider if you have:    •Symptoms that do not go away with home treatment.      •Pain that gets worse.      •Pain that affects your ability to move or do daily activities.        Summary    •Plantar fasciitis is a painful foot condition that affects the heel. It occurs when the band of tissue that connects the toes to the heel bone (plantar fascia) becomes irritated.      •Heel pain is the main symptom of this condition. It may get worse after exercising too much or standing still for a long time.      •Treatment varies, but it usually starts with rest, ice, pressure (compression), and raising (elevating) the affected foot. This is called RICE therapy. Over-the-counter medicines can also be used to manage pain.

## 2022-06-20 NOTE — ED PROVIDER NOTE - OBJECTIVE STATEMENT
32 yr old male, history of asthma, presents to the Emergency Department with heel pain. Pt notes ongoing heel pain x 2 months. No direct injury or trauma. Gradual onset, has been worsening over last few weeks. Bottom of left heel. Worse w walking, better w rest. Is on his feet a lot for work which has made symptoms worse. Occasionally takes ibuprofen w some relief.   No weakness, numbness, tingling in extremity.  Similar to pain he had 2-3 years ago after playing basketball frequently. Saw podiatrist and told he had plantar fasciitis. Eventually improved w supportive care. Has not followed up w podiatry since.

## 2022-06-20 NOTE — ED PROVIDER NOTE - PATIENT PORTAL LINK FT
You can access the FollowMyHealth Patient Portal offered by [facility] by registering at the following website: [email or web address]. By joining [employer]’s FollowMyHealth portal, you will also be able to view your health information using other applications (apps) compatible with our system.

## 2022-06-20 NOTE — ED PROVIDER NOTE - PROGRESS NOTE DETAILS
xr negative. some relief w meds in ED.   neurovascularly intact. ambulatory w steady gait.  ok for dc w supportive care and outpt follow up. given podiatry info

## 2022-08-11 ENCOUNTER — EMERGENCY (EMERGENCY)
Facility: HOSPITAL | Age: 32
LOS: 1 days | Discharge: LEFT BEFORE DISCHARGE | End: 2022-08-11
Admitting: EMERGENCY MEDICINE

## 2022-08-11 VITALS
SYSTOLIC BLOOD PRESSURE: 144 MMHG | WEIGHT: 240.08 LBS | TEMPERATURE: 98 F | HEIGHT: 71 IN | HEART RATE: 60 BPM | DIASTOLIC BLOOD PRESSURE: 98 MMHG | RESPIRATION RATE: 18 BRPM | OXYGEN SATURATION: 99 %

## 2022-08-11 DIAGNOSIS — Z90.89 ACQUIRED ABSENCE OF OTHER ORGANS: Chronic | ICD-10-CM

## 2022-08-11 DIAGNOSIS — J03.90 ACUTE TONSILLITIS, UNSPECIFIED: Chronic | ICD-10-CM

## 2022-08-11 PROCEDURE — L9991: CPT

## 2022-08-11 NOTE — ED ADULT TRIAGE NOTE - CHIEF COMPLAINT QUOTE
rapid test positive covid last night , now having difficulty of breathing, body aches and cough, hx of asthma

## 2022-08-12 NOTE — ED ADULT NURSE NOTE - EXPLANATION OF PATIENT'S REASON FOR LEAVING
pt refusing to wait longer trina bowser was called cliff and gene at bedside then pt left department on his own oaccord
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
## 2022-08-15 DIAGNOSIS — R06.00 DYSPNEA, UNSPECIFIED: ICD-10-CM

## 2022-08-15 DIAGNOSIS — Z53.21 PROCEDURE AND TREATMENT NOT CARRIED OUT DUE TO PATIENT LEAVING PRIOR TO BEING SEEN BY HEALTH CARE PROVIDER: ICD-10-CM

## 2022-12-09 NOTE — ED ADULT NURSE NOTE - SUICIDE SCREENING QUESTION 3
Date of Service: 12/05/2022    HISTORY OF PRESENT ILLNESS:    This patient with abdominal pain and also has a lump on his scrotum.  Also, his testicles and left scrotum are sore as well.  He has slight abdominal pain.    PHYSICAL EXAMINATION:    Abdomen is soft, nontender.  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  He also has swollen, tender left epididymis.  No hernia.    IMPRESSION:    Epididymitis and scrotal varices.    PLAN:    We will treat with Bactrim double strength b.i.d. for 10 days.      Dictated By: Eliud Ac MD  Signing Provider: Eliud Ac MD    [facility]/gabriel ([id number])   DD: 12/06/2022 5:21:45 PM TD: 12/09/2022 5:20:20 AM       No

## 2023-01-09 NOTE — ED PROVIDER NOTE - NEUROLOGICAL, MLM
Alert and oriented, no focal deficits, no motor or sensory deficits. Tarsorrhaphy Text: A tarsorrhaphy was performed using Frost sutures.

## 2023-03-14 ENCOUNTER — EMERGENCY (EMERGENCY)
Facility: HOSPITAL | Age: 33
LOS: 1 days | Discharge: LEFT BEFORE DISCHARGE | End: 2023-03-14
Admitting: EMERGENCY MEDICINE
Payer: SELF-PAY

## 2023-03-14 VITALS
WEIGHT: 238.1 LBS | TEMPERATURE: 99 F | HEIGHT: 72 IN | DIASTOLIC BLOOD PRESSURE: 110 MMHG | RESPIRATION RATE: 18 BRPM | HEART RATE: 71 BPM | OXYGEN SATURATION: 96 % | SYSTOLIC BLOOD PRESSURE: 149 MMHG

## 2023-03-14 DIAGNOSIS — J03.90 ACUTE TONSILLITIS, UNSPECIFIED: Chronic | ICD-10-CM

## 2023-03-14 DIAGNOSIS — Z90.89 ACQUIRED ABSENCE OF OTHER ORGANS: Chronic | ICD-10-CM

## 2023-03-14 PROCEDURE — L9991: CPT

## 2023-03-16 DIAGNOSIS — M79.602 PAIN IN LEFT ARM: ICD-10-CM

## 2023-03-16 DIAGNOSIS — Z53.21 PROCEDURE AND TREATMENT NOT CARRIED OUT DUE TO PATIENT LEAVING PRIOR TO BEING SEEN BY HEALTH CARE PROVIDER: ICD-10-CM

## 2023-05-31 NOTE — ED PROVIDER NOTE - CROS ED GI ALL NEG
General Adult HPI





- General


Chief complaint: Abdominal Pain


Stated complaint: IUD issues


Time Seen by Provider: 05/31/23 19:23


Source: patient, RN notes reviewed, old records reviewed


Mode of arrival: ambulatory


Limitations: no limitations





- History of Present Illness


Initial comments: 





36-year-old female, alert and oriented 4 presents to the emergency room stating

that she went to [place] for detox from her Adderall and Klonopin and was 

turned away, told to come to the emergency room for medical clearance first.  

Triage note states that she is here for vaginal bleeding however patient denies 

this complaint.  She states that her last menstrual period lasted 7-10 days and 

ended 10 days ago.  She denies any current vaginal bleeding, pelvic or abdominal

pain.  No vaginal discharge. No chance of pregnancy states that she has an IUD 

in place however she wants it removed and is following up with Dr. Owen her 

OB/GYN for removal.  Patient states that she wants detox from Adderall and 

Klonopin. In the past was on Sublicade orally which helped but seen Dr. Kidd 

who changed her to Suboxone which she states has not been helpful which is why 

she went to [place].  She denies any suicidal or homicidal ideations.  She 

is also being treated for hypothyroidism and on Synthroid


-: days(s) (1)


Severity scale (1-10): 0


Associated Symptoms: denies other symptoms





- Related Data


                                  Previous Rx's











 Medication  Instructions  Recorded


 


EPINEPHrine (Auto Inject) [Epipen] 0.3 mg IM ONCE PRN #2 pen 06/12/20


 


diphenhydrAMINE [Benadryl] 50 mg PO TID PRN #15 capsule 06/12/20


 


predniSONE 50 mg PO DAILY #4 tab 06/12/20











                                    Allergies











Allergy/AdvReac Type Severity Reaction Status Date / Time


 


Iodine and Iodide Containing Allergy  Itching Verified 05/31/23 17:21





Produc     


 


shellfish derived [Shellfish] Allergy  Itching Verified 05/31/23 17:21














Review of Systems


ROS Statement: 


Those systems with pertinent positive or pertinent negative responses have been 

documented in the HPI.





ROS Other: All systems not noted in ROS Statement are negative.





Past Medical History


Past Medical History: Thyroid Disorder


History of Any Multi-Drug Resistant Organisms: None Reported


Past Surgical History: Hernia Repair


Additional Past Surgical History / Comment(s): hernia repair


Past Psychological History: ADD/ADHD, Anxiety, Depression


Smoking Status: Current every day smoker


Past Alcohol Use History: None Reported


Past Drug Use History: None Reported





General Exam


Limitations: no limitations


General appearance: alert, in no apparent distress


Head exam: Present: atraumatic


Eye exam: Present: normal appearance.  Absent: scleral icterus, conjunctival 

injection, periorbital swelling, periorbital tenderness


ENT exam: Present: normal oropharynx, mucous membranes moist


Neck exam: Present: full ROM.  Absent: tenderness, meningismus


Respiratory exam: Present: normal lung sounds bilaterally.  Absent: respiratory 

distress, accessory muscle use


Cardiovascular Exam: Present: regular rate


GI/Abdominal exam: Present: soft.  Absent: distended, tenderness, guarding, 

rebound, rigid


Extremities exam: Present: normal inspection, full ROM, normal capillary refill.

 Absent: tenderness, pedal edema


Back exam: Present: full ROM.  Absent: tenderness, rash noted


Neurological exam: Present: alert, oriented X3


Psychiatric exam: Present: normal affect, normal mood, other (Hyperverbal).  

Absent: homicidal ideation, suicidal ideation


Skin exam: Present: warm, dry, normal color.  Absent: rash, cyanosis, 

diaphoretic, petechiae, pallor





Course


                                   Vital Signs











  05/31/23





  17:16


 


Temperature 97.9 F


 


Pulse Rate 92


 


Respiratory 20





Rate 


 


Blood Pressure 149/83


 


O2 Sat by Pulse 97





Oximetry 














Medical Decision Making





- Medical Decision Making








Was pt. sent in by a medical professional or institution ([facility], NP, urgent 

care, hospital, or nursing home...) When possible be specific


@  -Patient states sent from [place] for medical evaluation


Did you speak to anyone other than the patient for history (EMS, parent, family,

police, friend...)? What history was obtained from this source 


@  -No


Did you review nursing and triage notes (agree or disagree)?  Why? 


@  -I disagree with the nursing notes.  Patient states that she is not here for 

vaginal bleeding she is here at the request of [place] for medical 

evaluation.


Were old charts reviewed (outside hosp., previous admission, EMS record, old 

EKG, old radiological studies, urgent care reports/EKG's, nursing home records)?

Report findings 


@  -No old charts were reviewed


Differential Diagnosis (chest pain, altered mental status, abdominal pain women,

abdominal pain men, vaginal bleeding, weakness, fever, dyspnea, syncope, 

headache, dizziness, GI bleed, back pain, seizure, CVA, palpatations, mental 

health, musculoskeletal)? 


@  -Differential Mental Health


Depression, anxiety, bipolar, psychosis, schizophrenia, borderline personality, 

situational depression, adjustment disorder, behavioral disorder, brain tumor, 

malingering, substance abuse, encephalopathy, medication reaction, dementia, 

hypothyroidism, degenerative neurologic disorder, lupus.... This is not meant to

be all-inclusive list


EKG interpreted by me (3pts min.).


@  -n/a


X-rays interpreted by me (1pt min.).


@  -None done


CT interpreted by me (1pt min.).


@  -None done


U/S interpreted by me (1pt. min.).


@  -None done


What testing was considered but not performed or refused? (CT, X-rays, U/S, 

labs)? Why?


@  -None


What meds were considered but not given or refused? Why?


@  -None


Did you discuss the management of the patient with other professionals 

(professionals i.e. , PA, NP, lab, RT, psych nurse, , , 

teacher, , )? Give summary


@  -No


Was smoking cessation discussed for >3mins.?


@  -Yes, patient states that she did stop smoking and was vaping however [place] does not allow vaping so she started smoking cigarettes again


Was critical care preformed (if so, how long)?


@  -No


Were there social determinants of health that impacted care today? How? 

(Homelessness, low income, unemployed, alcoholism, drug addiction, 

transportation, low edu. Level, literacy, decrease access to med. care, half-way, 

rehab)?


@  -No


Was there de-escalation of care discussed even if they declined (Discuss DNR or 

withdrawal of care, Hospice)? DNR status


@  -No


What co-morbidities impacted this encounter? (DM, HTN, Smoking, COPD, CAD, 

Cancer, CVA, ARF, Chemo, Hep., AIDS, mental health diagnosis, sleep apnea, 

morbid obesity)?


@  -ADHD, anxiety, depression, hypothyroid, smoking


Was patient admitted / discharged? Hospital course, mention meds given and 

route, prescriptions, significant lab abnormalities, going to OR and other 

pertinent info.


@  -Discharged 


36-year-old female, alert and oriented 4 presents to the emergency room stating

that she went to [place] for detox from her Adderall and Klonopin and was 

turned away, told to come to the emergency room for medical clearance first.  

Triage note states that she is here for vaginal bleeding however patient denies 

this complaint.  She states that her last menstrual period lasted 7-10 days and 

ended 10 days ago.  She denies any current vaginal bleeding, pelvic or abdominal

pain.  No vaginal discharge. No chance of pregnancy states that she has an IUD 

in place however she wants it removed and is following up with Dr. Owen her 

OB/GYN for removal.  Patient states that she wants detox from Adderall and 

Klonopin. In the past was on Sublicade orally which helped but seen Dr. Kidd 

who changed her to Suboxone which she states has not been helpful which is why 

she went to [place].  She denies any suicidal or homicidal ideations.  She 

is also being treated for hypothyroidism and on Synthroid


History of hypothyroid, ADHD, anxiety, depression, smoker


Vital signs are stable.


CBC and electrolytes show no concerning values.  Her TSH is elevated at 6.9, f

ree T4 1 0.77.


On physical exam patient has no abdominal pain.  


No homicidal or suicidal ideations.  She was discharged back to [place] to 

continue her detox.


Case discussed with Dr. Saldivar.


Undiagnosed new problem with uncertain prognosis?


@  -No


Drug Therapy requiring intensive monitoring for toxicity (Heparin, Nitro, 

Insulin, Cardizem)?


@  -No


Were any procedures done?


@  -No


Diagnosis/symptom?


@  -Anxiety, detox from Adderall and Klonopin


Acute, or Chronic, or Acute on Chronic?


@  -Acute


Uncomplicated (without systemic symptoms) or Complicated (systemic symptoms)?


@  -Uncomplicated


Side effects of treatment?


@  -No


Exacerbation, Progression, or Severe Exacerbation?


@  -No


Poses a threat to life or bodily function? How? (Chest pain, USA, MI, pneumonia,

PE, COPD, DKA, ARF, appy, cholecystitis, CVA, Diverticulitis, Homicidal, 

Suicidal, threat to staff... and all critical care pts)


@  -No





- Lab Data


Result diagrams: 


                                 05/31/23 20:09





                                 05/31/23 20:09


                                   Lab Results











  05/31/23 05/31/23 05/31/23 Range/Units





  20:09 20:09 20:09 


 


WBC  7.0    (3.8-10.6)  k/uL


 


RBC  4.41    (3.80-5.40)  m/uL


 


Hgb  12.6    (11.4-16.0)  gm/dL


 


Hct  38.4    (34.0-46.0)  %


 


MCV  87.0    (80.0-100.0)  fL


 


MCH  28.5    (25.0-35.0)  pg


 


MCHC  32.7    (31.0-37.0)  g/dL


 


RDW  13.2    (11.5-15.5)  %


 


Plt Count  302    (150-450)  k/uL


 


MPV  7.9    


 


Neutrophils %  45    %


 


Lymphocytes %  43    %


 


Monocytes %  7    %


 


Eosinophils %  2    %


 


Basophils %  1    %


 


Neutrophils #  3.2    (1.3-7.7)  k/uL


 


Lymphocytes #  3.0    (1.0-4.8)  k/uL


 


Monocytes #  0.5    (0-1.0)  k/uL


 


Eosinophils #  0.2    (0-0.7)  k/uL


 


Basophils #  0.1    (0-0.2)  k/uL


 


PT   11.0   (9.0-12.0)  sec


 


INR   1.0   (<1.2)  


 


Sodium     (137-145)  mmol/L


 


Potassium     (3.5-5.1)  mmol/L


 


Chloride     ()  mmol/L


 


Carbon Dioxide     (22-30)  mmol/L


 


Anion Gap     mmol/L


 


BUN     (7-17)  mg/dL


 


Creatinine     (0.52-1.04)  mg/dL


 


Est GFR (CKD-EPI)AfAm     (>60 ml/min/1.73 sqM)  


 


Est GFR (CKD-EPI)NonAf     (>60 ml/min/1.73 sqM)  


 


Glucose     (74-99)  mg/dL


 


Calcium     (8.4-10.2)  mg/dL


 


Total Bilirubin     (0.2-1.3)  mg/dL


 


AST     (14-36)  U/L


 


ALT     (4-34)  U/L


 


Alkaline Phosphatase     ()  U/L


 


Total Protein     (6.3-8.2)  g/dL


 


Albumin     (3.5-5.0)  g/dL


 


TSH     (0.465-4.680)  mIU/L


 


Free T4     (0.78-2.19)  ng/dL


 


Urine Color     


 


Urine Appearance     (Clear)  


 


Urine pH     (5.0-8.0)  


 


Ur Specific Gravity     (1.001-1.035)  


 


Urine Protein     (Negative)  


 


Urine Glucose (UA)     (Negative)  


 


Urine Ketones     (Negative)  


 


Urine Blood     (Negative)  


 


Urine Nitrite     (Negative)  


 


Urine Bilirubin     (Negative)  


 


Urine Urobilinogen     (<2.0)  mg/dL


 


Ur Leukocyte Esterase     (Negative)  


 


Urine RBC     (0-5)  /hpf


 


Urine WBC     (0-5)  /hpf


 


Ur Squamous Epith Cells     (0-4)  /hpf


 


Urine Mucus     (None)  /hpf


 


Urine HCG, Qual    Not Detected  (Not Detectd)  














  05/31/23 05/31/23 Range/Units





  20:09 20:09 


 


WBC    (3.8-10.6)  k/uL


 


RBC    (3.80-5.40)  m/uL


 


Hgb    (11.4-16.0)  gm/dL


 


Hct    (34.0-46.0)  %


 


MCV    (80.0-100.0)  fL


 


MCH    (25.0-35.0)  pg


 


MCHC    (31.0-37.0)  g/dL


 


RDW    (11.5-15.5)  %


 


Plt Count    (150-450)  k/uL


 


MPV    


 


Neutrophils %    %


 


Lymphocytes %    %


 


Monocytes %    %


 


Eosinophils %    %


 


Basophils %    %


 


Neutrophils #    (1.3-7.7)  k/uL


 


Lymphocytes #    (1.0-4.8)  k/uL


 


Monocytes #    (0-1.0)  k/uL


 


Eosinophils #    (0-0.7)  k/uL


 


Basophils #    (0-0.2)  k/uL


 


PT    (9.0-12.0)  sec


 


INR    (<1.2)  


 


Sodium  139   (137-145)  mmol/L


 


Potassium  3.5   (3.5-5.1)  mmol/L


 


Chloride  102   ()  mmol/L


 


Carbon Dioxide  27   (22-30)  mmol/L


 


Anion Gap  10   mmol/L


 


BUN  19 H   (7-17)  mg/dL


 


Creatinine  0.81   (0.52-1.04)  mg/dL


 


Est GFR (CKD-EPI)AfAm  >90   (>60 ml/min/1.73 sqM)  


 


Est GFR (CKD-EPI)NonAf  >90   (>60 ml/min/1.73 sqM)  


 


Glucose  122 H   (74-99)  mg/dL


 


Calcium  9.6   (8.4-10.2)  mg/dL


 


Total Bilirubin  0.4   (0.2-1.3)  mg/dL


 


AST  35   (14-36)  U/L


 


ALT  32   (4-34)  U/L


 


Alkaline Phosphatase  57   ()  U/L


 


Total Protein  7.4   (6.3-8.2)  g/dL


 


Albumin  4.3   (3.5-5.0)  g/dL


 


TSH  6.970 H   (0.465-4.680)  mIU/L


 


Free T4  1.77   (0.78-2.19)  ng/dL


 


Urine Color   Yellow  


 


Urine Appearance   Clear  (Clear)  


 


Urine pH   5.5  (5.0-8.0)  


 


Ur Specific Gravity   1.033  (1.001-1.035)  


 


Urine Protein   2+ H  (Negative)  


 


Urine Glucose (UA)   Negative  (Negative)  


 


Urine Ketones   Trace H  (Negative)  


 


Urine Blood   Negative  (Negative)  


 


Urine Nitrite   Negative  (Negative)  


 


Urine Bilirubin   Negative  (Negative)  


 


Urine Urobilinogen   2.0  (<2.0)  mg/dL


 


Ur Leukocyte Esterase   Trace H  (Negative)  


 


Urine RBC   2  (0-5)  /hpf


 


Urine WBC   7 H  (0-5)  /hpf


 


Ur Squamous Epith Cells   2  (0-4)  /hpf


 


Urine Mucus   Many H  (None)  /hpf


 


Urine HCG, Qual    (Not Detectd)  














Disposition


Clinical Impression: 


 Desire for detoxification, Hypothyroidism





Disposition: HOME SELF-CARE


Condition: Good


Instructions (If sedation given, give patient instructions):  Hypothyroidism 

(ED)


Is patient prescribed a controlled substance at d/c from ED?: No


Referrals: 


None,Stated [Primary Care Provider] - 1-2 days


Time of Disposition: 21:48 negative...

## 2023-10-07 ENCOUNTER — EMERGENCY (EMERGENCY)
Facility: HOSPITAL | Age: 33
LOS: 1 days | Discharge: HOME | End: 2023-10-07
Attending: STUDENT IN AN ORGANIZED HEALTH CARE EDUCATION/TRAINING PROGRAM | Admitting: STUDENT IN AN ORGANIZED HEALTH CARE EDUCATION/TRAINING PROGRAM
Payer: COMMERCIAL

## 2023-10-07 VITALS
SYSTOLIC BLOOD PRESSURE: 158 MMHG | DIASTOLIC BLOOD PRESSURE: 88 MMHG | HEART RATE: 82 BPM | OXYGEN SATURATION: 98 % | RESPIRATION RATE: 18 BRPM | TEMPERATURE: 98 F

## 2023-10-07 VITALS
TEMPERATURE: 98 F | HEART RATE: 78 BPM | SYSTOLIC BLOOD PRESSURE: 174 MMHG | DIASTOLIC BLOOD PRESSURE: 100 MMHG | OXYGEN SATURATION: 98 % | WEIGHT: 220.02 LBS | RESPIRATION RATE: 20 BRPM

## 2023-10-07 DIAGNOSIS — J03.90 ACUTE TONSILLITIS, UNSPECIFIED: Chronic | ICD-10-CM

## 2023-10-07 DIAGNOSIS — Z90.89 ACQUIRED ABSENCE OF OTHER ORGANS: Chronic | ICD-10-CM

## 2023-10-07 PROCEDURE — 99284 EMERGENCY DEPT VISIT MOD MDM: CPT

## 2023-10-07 PROCEDURE — 99283 EMERGENCY DEPT VISIT LOW MDM: CPT | Mod: 25

## 2023-10-07 PROCEDURE — 94640 AIRWAY INHALATION TREATMENT: CPT

## 2023-10-07 RX ORDER — ALBUTEROL 90 UG/1
2 AEROSOL, METERED ORAL EVERY 6 HOURS
Refills: 0 | Status: DISCONTINUED | OUTPATIENT
Start: 2023-10-07 | End: 2023-10-10

## 2023-10-07 RX ORDER — ALBUTEROL 90 UG/1
2 AEROSOL, METERED ORAL
Qty: 1 | Refills: 0
Start: 2023-10-07

## 2023-10-07 RX ADMIN — Medication 50 MILLIGRAM(S): at 11:21

## 2023-10-07 RX ADMIN — ALBUTEROL 2 PUFF(S): 90 AEROSOL, METERED ORAL at 11:22

## 2023-10-07 NOTE — ED PROVIDER NOTE - PHYSICAL EXAMINATION
CONST: nontoxic NAD speaking in full sentences  HEAD: atraumatic  EYES: conjunctivae clear  NECK: supple  CARD: regular rate  CHEST: breathing comfortably, no stridor/retractions/tripoding, +good air entry, +b/l wheezing throughout  EXT: FROM  SKIN: warm, dry  NEURO: awake alert answering questions following commands moving all extremities

## 2023-10-07 NOTE — ED ADULT TRIAGE NOTE - CHIEF COMPLAINT QUOTE
Pt walked in aaox3 c/o worsening asthma exacerbation since this morning. Pt c/o dry cough. Pt does not have any home nebs, and requesting a new prescription. pt talking in clear full sentences.

## 2023-10-07 NOTE — ED PROVIDER NOTE - OBJECTIVE STATEMENT
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 Pt says for past few days he had cough w/ yellow sputum, no fever or chills, says cough improving but now feels mildly SOB. Has not had inhalers or nebulizer at home for 5 years (gave it to his uncle bc thought he was better), comes in today requesting prescription for albuterol

## 2023-10-07 NOTE — ED ADULT NURSE NOTE - OBJECTIVE STATEMENT
Patient w/ Hx of asthma, c/o cough X 1 week, this morning SOB, no wheezing, no fever/chills, no wheezing.  States has no rescue inhaler or meds.  No difficulty speaking in long sentences.

## 2023-10-07 NOTE — ED PROVIDER NOTE - NSFOLLOWUPINSTRUCTIONS_ED_ALL_ED_FT
Please follow up with your primary doctor. Take all medications as prescribed- 1 prednisone tablet daily for the next 4 days, use the albuterol inhaler as directed- 2 puffs inhaled every 6 hours for the first 24 hours, then every 4 hours as needed for shortness of breath or wheezing.  Also your blood pressure reading was high, make sure to get it rechecked with your primary doctor.    Please reach out to Mary Gregory ([facility] clinical referral coordinator) to assist you with your follow-up appointment.     Monday - Friday 11am-7pm  [phone]  [email or web address]     Asthma    Asthma is a condition in which the airways tighten and narrow, making it difficult to breath. Asthma episodes, also called asthma attacks, range from minor to life-threatening. Symptoms include wheezing, coughing, chest tightness, or shortness of breath. The diagnosis of asthma is made by a review of your medical history and a physical exam, but may involve additional testing. Asthma cannot be cured, but medicines and lifestyle changes can help control it. Avoid triggers of asthma which may include animal dander, pollen, mold, smoke, air pollutants, etc.     SEEK IMMEDIATE MEDICAL CARE IF YOU HAVE ANY OF THE FOLLOWING SYMPTOMS: worsening of symptoms, shortness of breath at rest, chest pain, bluish discoloration to lips or fingertips, lightheadedness/dizziness, or fever.

## 2023-10-07 NOTE — ED PROVIDER NOTE - PATIENT PORTAL LINK FT
You can access the FollowMyHealth Patient Portal offered by [facility] by registering at the following website: [email or web address]. By joining [facility]’s FollowMyHealth portal, you will also be able to view your health information using other applications (apps) compatible with our system.

## 2023-10-07 NOTE — ED ADULT NURSE NOTE - HIV OFFER
CINTHYA Singh CNP [facility] PCP - Community Hospital South EmpSan Carlos Apache Tribe Healthcare Corporation Opt out

## 2023-10-07 NOTE — ED PROVIDER NOTE - CLINICAL SUMMARY MEDICAL DECISION MAKING FREE TEXT BOX
Normal VS and O2 sat, pt breathing v comfortably, speaking full sentences, no accessory muscle use, no resp distress  b/l wheezing on exam consistent w/ asthma exacerbation in setting of likely viral URI (recent cough w/ yellow sputum)  pt does not want to stay for duonebs, prefers albuterol inhaler and dc, will give albuterol inhaler and short course of steroids for treatment of acute asthma exacerbation  No further indication for emergent or inpatient workup, pt stable and ready for discharge. Results, diagnosis and post-discharge plan including appropriate outpatient follow up were discussed and all questions answered

## 2023-10-07 NOTE — ED ADULT NURSE NOTE - NSFALLUNIVINTERV_ED_ALL_ED
Bed/Stretcher in lowest position, wheels locked, appropriate side rails in place/Call bell, personal items and telephone in reach/Instruct patient to call for assistance before getting out of bed/chair/stretcher/Non-slip footwear applied when patient is off stretcher/[place] to call system/Physically safe environment - no spills, clutter or unnecessary equipment/Purposeful proactive rounding/Room/bathroom lighting operational, light cord in reach Hatchet Flap Text: The defect edges were debeveled with a #15 scalpel blade.  Given the location of the defect, shape of the defect and the proximity to free margins a hatchet flap was deemed most appropriate.  Using a sterile surgical marker, an appropriate hatchet flap was drawn incorporating the defect and placing the expected incisions within the relaxed skin tension lines where possible.    The area thus outlined was incised deep to adipose tissue with a #15 scalpel blade.  The skin margins were undermined to an appropriate distance in all directions utilizing iris scissors.

## 2023-10-07 NOTE — ED PROVIDER NOTE - INTERNATIONAL TRAVEL
No Hypertriglyceridemia Treatment: I explained this is common when taking isotretinoin. If this worsens they will contact us. They may try OTC ibuprofen.

## 2023-10-10 DIAGNOSIS — H91.91 UNSPECIFIED HEARING LOSS, RIGHT EAR: ICD-10-CM

## 2023-10-10 DIAGNOSIS — J45.901 UNSPECIFIED ASTHMA WITH (ACUTE) EXACERBATION: ICD-10-CM

## 2023-10-10 DIAGNOSIS — R05.9 COUGH, UNSPECIFIED: ICD-10-CM

## 2025-04-03 NOTE — ED ADULT TRIAGE NOTE - AS HEIGHT TYPE
"Virtual Visit Details    Type of service:  Video Visit     Originating Location (pt. Location): {video visit patient location:892121::\"Home\"}  {PROVIDER LOCATION On-site should be selected for visits conducted from your clinic location or adjoining [facility] hospital, academic office, or other nearby [facility] building. Off-site should be selected for all other provider locations, including home:[id number]}  Distant Location (provider location):  {virtual location provider:[id number]}  Platform used for Video Visit: {Virtual Visit Platforms:149287::\"Global Axcess\"}    " stated

## 2025-05-29 NOTE — ED ADULT TRIAGE NOTE - NS ED NURSE DIRECT TO ROOM YN
Philippe Goldstein  Patient's  is 2002  Seen in office on 2025      SUBJECTIVE:  Philippe mercado 23 y.o.year old male presents today   Chief Complaint   Patient presents with    Follow-up     History of Present Illness  The patient is a 23-year-old male who presents for chest pain, tachycardia, and anxiety.    He has experienced two emergency room visits since his last consultation. The first visit occurred on 05/15/2025 due to an episode of chest pain and tachycardia. He describes the onset of symptoms as a sudden, unusual sensation in his neck, akin to needing to burp, followed by a rapid heart rate. Despite attempts to alleviate the symptoms through deep breathing and physical support against a wall, his heart rate remained elevated at approximately 200 beats per minute for 45 minutes to an hour. He sought medical attention at the ER where he was diagnosed with supraventricular tachycardia (SVT). He reports persistent daily chest pain. He has consulted with cardiologist Dr. Yu and  EP Dr. Jason parmar, the latter of whom did not recommend ablation at this time. He has an upcoming EP study scheduled in 2025. He has discontinued vaping due to concerns about his chest pain. He has previously undergone a stress test, which yielded normal results.    He also reports an incident of a hot flash, facial redness, hand numbness, headache, and double vision while shopping with his girlfriend, which led to another ER visit a few days ago. He did not experience a rapid heart rate during this episode. He is currently preparing to join the [employer] and is in the process of securing his own apartment. He has been abstaining from exercise and running due to his symptoms.    He has a history of anxiety, which he believes may be exacerbating his cardiac symptoms. He experiences social anxiety at work and is considering medication for his anxiety.    SOCIAL HISTORY  He quit vaping due to chest pain.    FAMILY HISTORY  His  No